# Patient Record
Sex: FEMALE | Race: BLACK OR AFRICAN AMERICAN | NOT HISPANIC OR LATINO
[De-identification: names, ages, dates, MRNs, and addresses within clinical notes are randomized per-mention and may not be internally consistent; named-entity substitution may affect disease eponyms.]

---

## 2020-02-05 PROBLEM — Z00.00 ENCOUNTER FOR PREVENTIVE HEALTH EXAMINATION: Status: ACTIVE | Noted: 2020-02-05

## 2020-02-18 ENCOUNTER — APPOINTMENT (OUTPATIENT)
Dept: ORTHOPEDIC SURGERY | Facility: CLINIC | Age: 45
End: 2020-02-18
Payer: COMMERCIAL

## 2020-02-18 VITALS — WEIGHT: 202 LBS | HEIGHT: 61 IN | BODY MASS INDEX: 38.14 KG/M2

## 2020-02-18 DIAGNOSIS — M25.572 PAIN IN LEFT ANKLE AND JOINTS OF LEFT FOOT: ICD-10-CM

## 2020-02-18 DIAGNOSIS — Z78.9 OTHER SPECIFIED HEALTH STATUS: ICD-10-CM

## 2020-02-18 PROCEDURE — 99204 OFFICE O/P NEW MOD 45 MIN: CPT

## 2020-02-18 NOTE — HISTORY OF PRESENT ILLNESS
[FreeTextEntry1] : Location: left ankle\par Quality: aching\par Duration: 2 months\par Context:  atraumatic\par Aggravating Factors:  Walking, stairs, weightbearing\par Conservative treatment:  REst, nsaids\par Associated Symptoms:  Swelling\par Prior Studies: n.a\par

## 2020-02-18 NOTE — PHYSICAL EXAM
[de-identified] : Outside x-rays performed on 1/28/2020 on the patient's cell phone showed no evidence of fracture dislocation of the ankle [de-identified] : Left ankle shows no warmth . There is some lateral swelling. Some tenderness over the peroneal tendons. No subluxation. No instability. Full range of motion. 5/5 strength. Neurovascular exam is normal.

## 2020-02-18 NOTE — DISCUSSION/SUMMARY
[de-identified] : Patient seems to have peroneal tendinitis. I recommend getting an MRI to rule out a peroneus brevis tear. I will call her with the results. Follow up by telephone